# Patient Record
Sex: MALE | Race: WHITE | NOT HISPANIC OR LATINO | Employment: OTHER | URBAN - METROPOLITAN AREA
[De-identification: names, ages, dates, MRNs, and addresses within clinical notes are randomized per-mention and may not be internally consistent; named-entity substitution may affect disease eponyms.]

---

## 2017-09-07 ENCOUNTER — APPOINTMENT (OUTPATIENT)
Dept: PHYSICAL THERAPY | Facility: CLINIC | Age: 52
End: 2017-09-07
Payer: COMMERCIAL

## 2017-09-07 PROCEDURE — 97140 MANUAL THERAPY 1/> REGIONS: CPT

## 2017-09-07 PROCEDURE — 97110 THERAPEUTIC EXERCISES: CPT

## 2017-09-07 PROCEDURE — 97163 PT EVAL HIGH COMPLEX 45 MIN: CPT

## 2017-09-11 ENCOUNTER — APPOINTMENT (OUTPATIENT)
Dept: PHYSICAL THERAPY | Facility: CLINIC | Age: 52
End: 2017-09-11
Payer: COMMERCIAL

## 2017-09-11 PROCEDURE — 97014 ELECTRIC STIMULATION THERAPY: CPT

## 2017-09-11 PROCEDURE — 97110 THERAPEUTIC EXERCISES: CPT

## 2017-09-11 PROCEDURE — 97140 MANUAL THERAPY 1/> REGIONS: CPT

## 2017-09-15 ENCOUNTER — APPOINTMENT (OUTPATIENT)
Dept: PHYSICAL THERAPY | Facility: CLINIC | Age: 52
End: 2017-09-15
Payer: COMMERCIAL

## 2017-09-15 PROCEDURE — 97110 THERAPEUTIC EXERCISES: CPT

## 2017-09-15 PROCEDURE — 97014 ELECTRIC STIMULATION THERAPY: CPT

## 2017-09-15 PROCEDURE — 97140 MANUAL THERAPY 1/> REGIONS: CPT

## 2017-09-19 ENCOUNTER — APPOINTMENT (OUTPATIENT)
Dept: PHYSICAL THERAPY | Facility: CLINIC | Age: 52
End: 2017-09-19
Payer: COMMERCIAL

## 2017-09-19 PROCEDURE — 97014 ELECTRIC STIMULATION THERAPY: CPT

## 2017-09-19 PROCEDURE — 97140 MANUAL THERAPY 1/> REGIONS: CPT

## 2017-09-19 PROCEDURE — 97110 THERAPEUTIC EXERCISES: CPT

## 2017-09-21 ENCOUNTER — APPOINTMENT (OUTPATIENT)
Dept: PHYSICAL THERAPY | Facility: CLINIC | Age: 52
End: 2017-09-21
Payer: COMMERCIAL

## 2017-09-21 PROCEDURE — 97014 ELECTRIC STIMULATION THERAPY: CPT

## 2017-09-21 PROCEDURE — 97110 THERAPEUTIC EXERCISES: CPT

## 2017-09-26 ENCOUNTER — APPOINTMENT (OUTPATIENT)
Dept: PHYSICAL THERAPY | Facility: CLINIC | Age: 52
End: 2017-09-26
Payer: COMMERCIAL

## 2017-09-26 PROCEDURE — 97014 ELECTRIC STIMULATION THERAPY: CPT

## 2017-09-26 PROCEDURE — 97110 THERAPEUTIC EXERCISES: CPT

## 2017-09-26 PROCEDURE — 97140 MANUAL THERAPY 1/> REGIONS: CPT

## 2017-09-28 ENCOUNTER — APPOINTMENT (OUTPATIENT)
Dept: PHYSICAL THERAPY | Facility: CLINIC | Age: 52
End: 2017-09-28
Payer: COMMERCIAL

## 2017-09-29 ENCOUNTER — APPOINTMENT (OUTPATIENT)
Dept: PHYSICAL THERAPY | Facility: CLINIC | Age: 52
End: 2017-09-29
Payer: COMMERCIAL

## 2017-09-29 PROCEDURE — 97014 ELECTRIC STIMULATION THERAPY: CPT

## 2017-09-29 PROCEDURE — 97140 MANUAL THERAPY 1/> REGIONS: CPT

## 2017-09-29 PROCEDURE — 97110 THERAPEUTIC EXERCISES: CPT

## 2017-10-03 ENCOUNTER — APPOINTMENT (OUTPATIENT)
Dept: PHYSICAL THERAPY | Facility: CLINIC | Age: 52
End: 2017-10-03
Payer: COMMERCIAL

## 2017-10-03 PROCEDURE — 97140 MANUAL THERAPY 1/> REGIONS: CPT

## 2017-10-03 PROCEDURE — 97014 ELECTRIC STIMULATION THERAPY: CPT

## 2017-10-03 PROCEDURE — 97110 THERAPEUTIC EXERCISES: CPT

## 2017-10-05 ENCOUNTER — APPOINTMENT (OUTPATIENT)
Dept: PHYSICAL THERAPY | Facility: CLINIC | Age: 52
End: 2017-10-05
Payer: COMMERCIAL

## 2017-10-05 PROCEDURE — 97140 MANUAL THERAPY 1/> REGIONS: CPT

## 2017-10-05 PROCEDURE — 97014 ELECTRIC STIMULATION THERAPY: CPT

## 2017-10-05 PROCEDURE — 97110 THERAPEUTIC EXERCISES: CPT

## 2017-10-09 ENCOUNTER — APPOINTMENT (OUTPATIENT)
Dept: PHYSICAL THERAPY | Facility: CLINIC | Age: 52
End: 2017-10-09
Payer: COMMERCIAL

## 2017-10-09 PROCEDURE — 97014 ELECTRIC STIMULATION THERAPY: CPT

## 2017-10-09 PROCEDURE — 97140 MANUAL THERAPY 1/> REGIONS: CPT

## 2017-10-09 PROCEDURE — 97110 THERAPEUTIC EXERCISES: CPT

## 2017-10-12 ENCOUNTER — APPOINTMENT (OUTPATIENT)
Dept: PHYSICAL THERAPY | Facility: CLINIC | Age: 52
End: 2017-10-12
Payer: COMMERCIAL

## 2017-10-12 PROCEDURE — G8978 MOBILITY CURRENT STATUS: HCPCS

## 2017-10-12 PROCEDURE — G8979 MOBILITY GOAL STATUS: HCPCS

## 2017-10-12 PROCEDURE — 97140 MANUAL THERAPY 1/> REGIONS: CPT

## 2017-10-12 PROCEDURE — 97014 ELECTRIC STIMULATION THERAPY: CPT

## 2017-10-12 PROCEDURE — 97110 THERAPEUTIC EXERCISES: CPT

## 2017-10-17 ENCOUNTER — APPOINTMENT (OUTPATIENT)
Dept: PHYSICAL THERAPY | Facility: CLINIC | Age: 52
End: 2017-10-17
Payer: COMMERCIAL

## 2017-10-17 PROCEDURE — 97110 THERAPEUTIC EXERCISES: CPT

## 2017-10-17 PROCEDURE — 97140 MANUAL THERAPY 1/> REGIONS: CPT

## 2017-10-17 PROCEDURE — 97014 ELECTRIC STIMULATION THERAPY: CPT

## 2017-10-19 ENCOUNTER — APPOINTMENT (OUTPATIENT)
Dept: PHYSICAL THERAPY | Facility: CLINIC | Age: 52
End: 2017-10-19
Payer: COMMERCIAL

## 2017-10-19 PROCEDURE — 97140 MANUAL THERAPY 1/> REGIONS: CPT

## 2017-10-19 PROCEDURE — 97110 THERAPEUTIC EXERCISES: CPT

## 2017-10-19 PROCEDURE — 97014 ELECTRIC STIMULATION THERAPY: CPT

## 2017-10-24 ENCOUNTER — APPOINTMENT (OUTPATIENT)
Dept: PHYSICAL THERAPY | Facility: CLINIC | Age: 52
End: 2017-10-24
Payer: COMMERCIAL

## 2017-10-24 PROCEDURE — 97140 MANUAL THERAPY 1/> REGIONS: CPT

## 2017-10-24 PROCEDURE — 97014 ELECTRIC STIMULATION THERAPY: CPT

## 2017-10-24 PROCEDURE — 97110 THERAPEUTIC EXERCISES: CPT

## 2017-10-26 ENCOUNTER — APPOINTMENT (OUTPATIENT)
Dept: PHYSICAL THERAPY | Facility: CLINIC | Age: 52
End: 2017-10-26
Payer: COMMERCIAL

## 2017-10-31 ENCOUNTER — APPOINTMENT (OUTPATIENT)
Dept: PHYSICAL THERAPY | Facility: CLINIC | Age: 52
End: 2017-10-31
Payer: COMMERCIAL

## 2017-11-01 ENCOUNTER — APPOINTMENT (OUTPATIENT)
Dept: PHYSICAL THERAPY | Facility: CLINIC | Age: 52
End: 2017-11-01
Payer: COMMERCIAL

## 2017-11-01 PROCEDURE — 97140 MANUAL THERAPY 1/> REGIONS: CPT

## 2017-11-01 PROCEDURE — 97014 ELECTRIC STIMULATION THERAPY: CPT

## 2017-11-01 PROCEDURE — 97110 THERAPEUTIC EXERCISES: CPT

## 2023-04-25 ENCOUNTER — APPOINTMENT (EMERGENCY)
Dept: CT IMAGING | Facility: HOSPITAL | Age: 58
End: 2023-04-25

## 2023-04-25 ENCOUNTER — HOSPITAL ENCOUNTER (EMERGENCY)
Facility: HOSPITAL | Age: 58
Discharge: HOME | End: 2023-04-25
Attending: EMERGENCY MEDICINE

## 2023-04-25 VITALS
HEIGHT: 70 IN | RESPIRATION RATE: 17 BRPM | SYSTOLIC BLOOD PRESSURE: 165 MMHG | HEART RATE: 55 BPM | BODY MASS INDEX: 40.34 KG/M2 | DIASTOLIC BLOOD PRESSURE: 88 MMHG | OXYGEN SATURATION: 97 % | TEMPERATURE: 96.1 F | WEIGHT: 281.75 LBS

## 2023-04-25 DIAGNOSIS — R42 VERTIGO: ICD-10-CM

## 2023-04-25 DIAGNOSIS — R93.0 ABNORMAL CT SCAN, HEAD: Primary | ICD-10-CM

## 2023-04-25 DIAGNOSIS — I10 HYPERTENSION, UNSPECIFIED TYPE: ICD-10-CM

## 2023-04-25 LAB
ANION GAP SERPL CALCULATED.3IONS-SCNC: 7 MMOL/L (ref 4–13)
BASOPHILS # BLD AUTO: 0.04 THOUSANDS/ΜL (ref 0–0.1)
BASOPHILS NFR BLD AUTO: 0 % (ref 0–1)
BUN SERPL-MCNC: 13 MG/DL (ref 5–25)
CALCIUM SERPL-MCNC: 9.6 MG/DL (ref 8.4–10.2)
CHLORIDE SERPL-SCNC: 106 MMOL/L (ref 96–108)
CO2 SERPL-SCNC: 27 MMOL/L (ref 21–32)
CREAT SERPL-MCNC: 0.94 MG/DL (ref 0.6–1.3)
EOSINOPHIL # BLD AUTO: 0.16 THOUSAND/ΜL (ref 0–0.61)
EOSINOPHIL NFR BLD AUTO: 2 % (ref 0–6)
ERYTHROCYTE [DISTWIDTH] IN BLOOD BY AUTOMATED COUNT: 12.9 % (ref 11.6–15.1)
GFR SERPL CREATININE-BSD FRML MDRD: 90 ML/MIN/1.73SQ M
GLUCOSE SERPL-MCNC: 96 MG/DL (ref 65–140)
HCT VFR BLD AUTO: 43.5 % (ref 36.5–49.3)
HGB BLD-MCNC: 14.8 G/DL (ref 12–17)
IMM GRANULOCYTES # BLD AUTO: 0.03 THOUSAND/UL (ref 0–0.2)
IMM GRANULOCYTES NFR BLD AUTO: 0 % (ref 0–2)
LYMPHOCYTES # BLD AUTO: 2.49 THOUSANDS/ΜL (ref 0.6–4.47)
LYMPHOCYTES NFR BLD AUTO: 27 % (ref 14–44)
MCH RBC QN AUTO: 31.2 PG (ref 26.8–34.3)
MCHC RBC AUTO-ENTMCNC: 34 G/DL (ref 31.4–37.4)
MCV RBC AUTO: 92 FL (ref 82–98)
MONOCYTES # BLD AUTO: 0.55 THOUSAND/ΜL (ref 0.17–1.22)
MONOCYTES NFR BLD AUTO: 6 % (ref 4–12)
NEUTROPHILS # BLD AUTO: 5.93 THOUSANDS/ΜL (ref 1.85–7.62)
NEUTS SEG NFR BLD AUTO: 65 % (ref 43–75)
NRBC BLD AUTO-RTO: 0 /100 WBCS
PLATELET # BLD AUTO: 228 THOUSANDS/UL (ref 149–390)
PMV BLD AUTO: 10.7 FL (ref 8.9–12.7)
POTASSIUM SERPL-SCNC: 4.2 MMOL/L (ref 3.5–5.3)
RBC # BLD AUTO: 4.74 MILLION/UL (ref 3.88–5.62)
SODIUM SERPL-SCNC: 140 MMOL/L (ref 135–147)
WBC # BLD AUTO: 9.2 THOUSAND/UL (ref 4.31–10.16)

## 2023-04-25 RX ORDER — MECLIZINE HYDROCHLORIDE 25 MG/1
25 TABLET ORAL 3 TIMES DAILY PRN
Qty: 30 TABLET | Refills: 0 | Status: SHIPPED | OUTPATIENT
Start: 2023-04-25

## 2023-04-25 RX ORDER — MECLIZINE HCL 12.5 MG/1
25 TABLET ORAL ONCE
Status: COMPLETED | OUTPATIENT
Start: 2023-04-25 | End: 2023-04-25

## 2023-04-25 RX ADMIN — MECLIZINE 25 MG: 12.5 TABLET ORAL at 20:07

## 2023-04-25 RX ADMIN — SODIUM CHLORIDE, SODIUM LACTATE, POTASSIUM CHLORIDE, AND CALCIUM CHLORIDE 1000 ML: .6; .31; .03; .02 INJECTION, SOLUTION INTRAVENOUS at 20:07

## 2023-04-26 NOTE — ED PROVIDER NOTES
History  Chief Complaint   Patient presents with   • Hypertension     Patient reports bp has been 304 systolic  He reports nausea and dizziness  No recent changes to bp medication     Nika Medel is a 52-year-old male with a history of hypertension who presents to the emergency department after multiple discrete episodes of dizziness and vertiginous symptoms at home  He states that these have been also associated with increases in his blood pressure  Patient states that he is on current antihypertensive medication that includes amlodipine 10 mg  No recent changes to his medications  He states over the past 2-3 days he would have intermittent episodes (approximately 3-4 discrete episodes over 24 hours) in which she would describe he feels the room was spinning around him and would resolve after approximately 10-20 minutes  He states that there was no acute activity that would precipitate the symptoms  He states that these were also occurring while the patient was lying flat as well as when he was getting up from a lying to a standing position  He also states that at this time he is experiencing a frontal headache as well as elevations in his blood pressure and was instructed to come to the emergency department for further evaluation  He denies any fevers, chills, nausea, vomiting, changes in vision, changes in hearing, numbness, paresthesias, loss of consciousness, belly pain, chest pain, or shortness of breath  Secondary to the transitory nature of these events as well as the unsustained nature, he has not taken any medications to help with this discomfort        History provided by:  Patient   used: No    Hypertension  Severity:  Mild  Onset quality:  Sudden  Timing:  Constant  Progression:  Unchanged  Chronicity:  Chronic  Relieved by:  ACE inhibitors  Worsened by:  Nothing  Associated symptoms: no abdominal pain, no anxiety, no blurred vision, no chest pain, no confusion, no dizziness, no ear pain, no epistaxis, no fatigue, no fever, no headaches, no hematuria, no hypokalemia, no loss of consciousness, no nausea, no neck pain, no palpitations, no peripheral edema, no shortness of breath, no syncope, no tinnitus, not vomiting and no weakness        None       Past Medical History:   Diagnosis Date   • Hypertension        Past Surgical History:   Procedure Laterality Date   • BACK SURGERY     • CHOLECYSTECTOMY     • TONSILLECTOMY         History reviewed  No pertinent family history  I have reviewed and agree with the history as documented  E-Cigarette/Vaping     E-Cigarette/Vaping Substances     Social History     Tobacco Use   • Smoking status: Never   • Smokeless tobacco: Never   Substance Use Topics   • Alcohol use: Never   • Drug use: Never        Review of Systems   Constitutional: Negative for chills, fatigue and fever  HENT: Negative for ear pain, nosebleeds, sore throat and tinnitus  Eyes: Negative for blurred vision, pain and visual disturbance  Respiratory: Negative for cough and shortness of breath  Cardiovascular: Negative for chest pain, palpitations and syncope  Gastrointestinal: Negative for abdominal pain, nausea and vomiting  Genitourinary: Negative for dysuria and hematuria  Musculoskeletal: Negative for arthralgias, back pain and neck pain  Skin: Negative for color change and rash  Neurological: Negative for dizziness, seizures, loss of consciousness, syncope, weakness and headaches  Psychiatric/Behavioral: Negative for confusion  The patient is not nervous/anxious  All other systems reviewed and are negative        Physical Exam  ED Triage Vitals [04/25/23 1839]   Temperature Pulse Respirations Blood Pressure SpO2   (!) 96 1 °F (35 6 °C) 65 18 (!) 197/93 99 %      Temp Source Heart Rate Source Patient Position - Orthostatic VS BP Location FiO2 (%)   Temporal Monitor Lying Right arm --      Pain Score       --             Orthostatic Vital Signs  Vitals:    04/25/23 1839 04/25/23 1900 04/25/23 2030 04/25/23 2100   BP: (!) 197/93 (!) 181/87 (!) 187/89 165/88   Pulse: 65 63 59 55   Patient Position - Orthostatic VS: Lying          Physical Exam  Vitals and nursing note reviewed  Constitutional:       General: He is not in acute distress  Appearance: Normal appearance  He is well-developed and normal weight  He is not ill-appearing  HENT:      Head: Normocephalic and atraumatic  Right Ear: External ear normal       Left Ear: External ear normal       Nose: Nose normal  No congestion or rhinorrhea  Mouth/Throat:      Mouth: Mucous membranes are moist    Eyes:      Conjunctiva/sclera: Conjunctivae normal    Cardiovascular:      Rate and Rhythm: Normal rate and regular rhythm  Heart sounds: No murmur heard  Pulmonary:      Effort: Pulmonary effort is normal  No respiratory distress  Breath sounds: Normal breath sounds  Abdominal:      General: Abdomen is flat  Palpations: Abdomen is soft  Tenderness: There is no abdominal tenderness  There is no guarding or rebound  Musculoskeletal:         General: No swelling, deformity or signs of injury  Cervical back: Neck supple  Skin:     General: Skin is warm and dry  Capillary Refill: Capillary refill takes less than 2 seconds  Neurological:      General: No focal deficit present  Mental Status: He is alert and oriented to person, place, and time  Cranial Nerves: No cranial nerve deficit  Sensory: No sensory deficit  Motor: No weakness        Coordination: Coordination normal    Psychiatric:         Mood and Affect: Mood normal          ED Medications  Medications   lactated ringers bolus 1,000 mL (0 mL Intravenous Stopped 4/25/23 2107)   meclizine (ANTIVERT) tablet 25 mg (25 mg Oral Given 4/25/23 2007)       Diagnostic Studies  Results Reviewed     Procedure Component Value Units Date/Time    Basic metabolic panel [[id number]] Collected: 04/25/23 1957    Lab Status: Final result Specimen: Blood from Arm, Left Updated: 04/25/23 2034     Sodium 140 mmol/L      Potassium 4 2 mmol/L      Chloride 106 mmol/L      CO2 27 mmol/L      ANION GAP 7 mmol/L      BUN 13 mg/dL      Creatinine 0 94 mg/dL      Glucose 96 mg/dL      Calcium 9 6 mg/dL      eGFR 90 ml/min/1 73sq m     Narrative:      [place] guidelines for Chronic Kidney Disease (CKD):   •  Stage 1 with normal or high GFR (GFR > 90 mL/min/1 73 square meters)  •  Stage 2 Mild CKD (GFR = 60-89 mL/min/1 73 square meters)  •  Stage 3A Moderate CKD (GFR = 45-59 mL/min/1 73 square meters)  •  Stage 3B Moderate CKD (GFR = 30-44 mL/min/1 73 square meters)  •  Stage 4 Severe CKD (GFR = 15-29 mL/min/1 73 square meters)  •  Stage 5 End Stage CKD (GFR <15 mL/min/1 73 square meters)  Note: GFR calculation is accurate only with a steady state creatinine    CBC and differential [[id number]] Collected: 04/25/23 1957    Lab Status: Final result Specimen: Blood from Arm, Left Updated: 04/25/23 2010     WBC 9 20 Thousand/uL      RBC 4 74 Million/uL      Hemoglobin 14 8 g/dL      Hematocrit 43 5 %      MCV 92 fL      MCH 31 2 pg      MCHC 34 0 g/dL      RDW 12 9 %      MPV 10 7 fL      Platelets 955 Thousands/uL      nRBC 0 /100 WBCs      Neutrophils Relative 65 %      Immat GRANS % 0 %      Lymphocytes Relative 27 %      Monocytes Relative 6 %      Eosinophils Relative 2 %      Basophils Relative 0 %      Neutrophils Absolute 5 93 Thousands/µL      Immature Grans Absolute 0 03 Thousand/uL      Lymphocytes Absolute 2 49 Thousands/µL      Monocytes Absolute 0 55 Thousand/µL      Eosinophils Absolute 0 16 Thousand/µL      Basophils Absolute 0 04 Thousands/µL                  CT head without contrast   ED Interpretation by Lazarus Mormon, MD (04/25 2110)   FINDINGS:     PARENCHYMA:  No intracranial mass, mass effect or midline shift  No CT signs of acute infarction    No acute parenchymal hemorrhage      VENTRICLES AND EXTRA-AXIAL SPACES:  Normal for the patient's age      VISUALIZED ORBITS: There our small calcifications along the posterolateral wall of the right optic globe  Correlation with ophthalmologic evaluation advised  Small calcification also seen along the right posterolateral wall of the left optic globe      PARANASAL SINUSES: Small polyp or retention cyst seen anteriorly in the left maxillary sinus  The mastoid air cells are clear      CALVARIUM AND EXTRACRANIAL SOFT TISSUES:  Normal      IMPRESSION:     No acute intracranial abnormality  Additional findings as above                  Workstation performed: [id number]      Final Result by Philly Syed MD (04/25 2047)      No acute intracranial abnormality  Additional findings as above  Workstation performed: [id number]               Procedures  Procedures      ED Course                             SBIRT 22yo+    Flowsheet Row Most Recent Value   Initial Alcohol Screen: US AUDIT-C     1  How often do you have a drink containing alcohol? 0 Filed at: 04/25/2023 1838   2  How many drinks containing alcohol do you have on a typical day you are drinking? 0 Filed at: 04/25/2023 1838   3a  Male UNDER 65: How often do you have five or more drinks on one occasion? 0 Filed at: 04/25/2023 1838   3b  FEMALE Any Age, or MALE 65+: How often do you have 4 or more drinks on one occassion? 0 Filed at: 04/25/2023 1838   Audit-C Score 0 Filed at: 04/25/2023 1265   MARK: How many times in the past year have you    Used an illegal drug or used a prescription medication for non-medical reasons? Never Filed at: 04/25/2023 26[facility]  Ashu Lweis is a 61-year-old male who presents with episodes of vertigo as well as headaches in the setting of elevated blood pressure  He comes for evaluation of the symptomology      DDx including but not limited to: tension headache, cluster headache, migraine, ICH, SAH, tumor, meningitis, temporal arteritis, carbon monoxide poisoning, zoster, sinusitis  Based off initial presenting complaints, elevated blood pressure, and headache, laboratory evaluation was conducted as well as CT imaging of the head without contrast   CT of the imaging of the head showed no acute pathology but was noted for the incidental finding of a increased ossification around the orbits  It was recommended for ophthalmology follow-up  Patient states that he does follow-up regularly with an eye doctor and had recently seen them last month  He was notified of the CT findings and was indicated that he should reschedule an appointment with this provider soon as possible for continued evaluation of this incidental CT finding  Patient was also administered fluids as well as meclizine while in the emergency department and patient stated that he had near resolution of his headache discomfort and dizziness  Patient states that he was feeling well at time of discharge  Patient was sent with a prescription of meclizine for control of peripheral causes of room spinning and vertiginous symptoms  He was also encouraged to follow-up with his primary care provider and provided with the contact information to establish care with neurology for persistence of symptoms  Patient was agreeable with this plan and stated that he would follow-up with his primary care provider soon as possible  Allowing the patient to rest as well as provided with fluid administration, patient's blood pressure was noted to downtrend throughout the course of his stay in the emergency department as well as his last blood pressure reading being at 165/88  Patient was deemed stable for discharge home with close follow-up with PCP    Given his current antihypertensive regiment, it was discussed with the patient that it would be beneficial for him to follow-up with his primary care provider for potential alterations to the dosage of his medication as well as may be the potential of adding a second line high antihypertensive regiment  Patient was agreeable with this plan  Strict return precautions as well as red flag symptoms that would warrant continued evaluation the emergency department were discussed with the patient  Patient and family were agreeable with this plan  Abnormal CT scan, head: acute illness or injury  Hypertension, unspecified type: acute illness or injury  Vertigo: acute illness or injury  Amount and/or Complexity of Data Reviewed  Labs: ordered  Details: Laboratory evaluation unremarkable no signs of endorgan damage  No signs of kidney injury  Radiology: ordered  Details: CT imaging of the head was unremarkable for intracranial pathology or bleed  Small calcification appreciated with the optic lobe  Recommended follow-up with ophthalmology for further evaluation of these findings  Disposition  Final diagnoses:   Abnormal CT scan, head - VISUALIZED ORBITS: There our small calcifications along the posterolateral wall of the right optic globe  Correlation with ophthalmologic evaluation advised  Small calcification also seen along the right posterolateral wall of the left optic globe  Hypertension, unspecified type   Vertigo     Time reflects when diagnosis was documented in both MDM as applicable and the Disposition within this note     Time User Action Codes Description Comment    4/25/2023  8:51 PM Albino Ruck Add [R93 0] Abnormal CT scan, head     4/25/2023  8:51 PM Florance Conn [R93 0] Abnormal CT scan, head VISUALIZED ORBITS: There our small calcifications along the posterolateral wall of the right optic globe  Correlation with ophthalmologic evaluation advised  Small calcification also seen along the right posterolateral wall of the left optic globe      4/25/2023  9:31 PM Pleasant Rissa Add [I10] Hypertension, unspecified type     4/25/2023  9:33 PM Nathaly Pepe Evelio Starr [R42] Vertigo       ED Disposition     ED Disposition   Discharge    Condition   Stable    Date/Time   Tue Apr 25, 2023  9:33 PM    Comment   Odette Prince discharge to home/self care  Follow-up Information     Follow up With Specialties Details Why Contact Info Additional Information    [facility] Schedule an appointment as soon as possible for a visit  for evaluation of nonspecific abnormality seen on CT scan  [address] [place] [address] Dominguez Blackburn MD Family Medicine Schedule an appointment as soon as possible for a visit   [address] [facility] Schedule an appointment as soon as possible for a visit  Further discussion of your dizziness [address] [facility] [address] [facility] [id number]  [address] [facility] [place], [address] [place] [address]          Discharge Medication List as of 4/25/2023  9:34 PM      START taking these medications    Details   meclizine (ANTIVERT) 25 mg tablet Take 1 tablet (25 mg total) by mouth 3 (three) times a day as needed for dizziness, Starting Tue 4/25/2023, Normal           No discharge procedures on file  PDMP Review     None           ED Provider  Attending physically available and evaluated Odette Prince  I managed the patient along with the ED Attending      Electronically Signed by         Donnie Nevarez MD  04/25/23 2327

## 2023-04-26 NOTE — DISCHARGE INSTRUCTIONS
Return to the Emergency Department sooner if increased pain, fever, vomiting, lethargy, blurry vision, dizziness, weakness, difficulty walking or breathing, rash  Your blood pressure is elevated on your visit today  When left untreated, the damage that high blood pressure does to your circulatory system is a significant contributing factor to heart attack, stroke, chronic kidney disease and other health threats  Please arrange for a blood pressure recheck with a PCP within the next week for further evaluation  Based off of the findings on your CT scan, please reach out to your ophthalmology provider for continued evaluation and eye examination as well  Please follow-up with your PCP for further discussion of your blood pressure regiment  You have also been provided with prescription medications to help with your symptoms while at home

## 2023-04-27 LAB
ATRIAL RATE: 65 BPM
P AXIS: 60 DEGREES
PR INTERVAL: 212 MS
QRS AXIS: 25 DEGREES
QRSD INTERVAL: 116 MS
QT INTERVAL: 414 MS
QTC INTERVAL: 430 MS
T WAVE AXIS: 53 DEGREES
VENTRICULAR RATE: 65 BPM

## 2023-04-27 NOTE — ED ATTENDING ATTESTATION
4/25/2023  IBrad DO, saw and evaluated the patient  I have discussed the patient with the resident/non-physician practitioner and agree with the resident's/non-physician practitioner's findings, Plan of Care, and MDM as documented in the resident's/non-physician practitioner's note, except where noted  All available labs and Radiology studies were reviewed  I was present for key portions of any procedure(s) performed by the resident/non-physician practitioner and I was immediately available to provide assistance  At this point I agree with the current assessment done in the Emergency Department  I have conducted an independent evaluation of this patient a history and physical is as follows:    ED Course  ED Course as of 04/26/23 2258   Tue Apr 25, 2023 2050 VISUALIZED ORBITS: There our small calcifications along the posterolateral wall of the right optic globe  Correlation with ophthalmologic evaluation advised  Small calcification also seen along the right posterolateral wall of the left optic globe  2050 EKG interpreted by myself at EKG dated 4/25/2023 1841 demonstrates sinus rhythm with first-degree block at 65 bpm, prolonged MT interval, normal QRS interval, normal QTc interval, no STEMI  50-year-old male presenting for intermittent dizzy episodes consistent with vertigo also complaining of generalized headache  Blood pressure noted to be elevated patient with a history of hypertension  Physical exam reveals well-appearing patient in no distress normal neurologic exam   No hypoxia or tachycardia  Patient treated with IV fluids and meclizine significant improvement in his symptoms  Blood pressure improved on reassessment patient advised to follow-up PCP return if symptoms worsen    There were incidental findings as well on the CT scan which was performed of the head regarding calcifications near the orbits and he was made aware of the recommendation to follow-up with ophthalmology  Screening labs unremarkable  Patient felt comfortable to plan for discharge at this time  Low suspicion for central cause of dizziness          Critical Care Time  Procedures